# Patient Record
Sex: FEMALE | Race: OTHER
[De-identification: names, ages, dates, MRNs, and addresses within clinical notes are randomized per-mention and may not be internally consistent; named-entity substitution may affect disease eponyms.]

---

## 2017-06-11 ENCOUNTER — HOSPITAL ENCOUNTER (EMERGENCY)
Dept: HOSPITAL 80 - FED | Age: 37
Discharge: HOME | End: 2017-06-11
Payer: MEDICAID

## 2017-06-11 VITALS — OXYGEN SATURATION: 94 %

## 2017-06-11 VITALS — TEMPERATURE: 98.1 F

## 2017-06-11 VITALS — HEART RATE: 78 BPM | DIASTOLIC BLOOD PRESSURE: 69 MMHG | RESPIRATION RATE: 18 BRPM | SYSTOLIC BLOOD PRESSURE: 138 MMHG

## 2017-06-11 DIAGNOSIS — M25.512: Primary | ICD-10-CM

## 2017-06-11 DIAGNOSIS — J45.909: ICD-10-CM

## 2017-06-11 NOTE — EDPHY
H & P


Stated Complaint: lifting weights friday/developed r arm and shoulder pain


Time Seen by Provider: 06/11/17 12:23





- Personal History


LMP (Females 10-55): 1-7 Days Ago


Current Tetanus/Diphtheria Vaccine: Yes





- Medical/Surgical History


Hx Asthma: Yes


Hx Chronic Respiratory Disease: No


Hx Diabetes: No


Hx Cardiac Disease: No


Hx Renal Disease: No


Hx Cirrhosis: No


Hx Alcoholism: No


Hx HIV/AIDS: No


Hx Splenectomy or Spleen Trauma: No


Other PMH: PMHx: denies.  PSHx: denies





- Social History


Smoking Status: Never smoked


Constitutional: 


 Initial Vital Signs











Temperature (C)  36.7 C   06/11/17 11:21


 


Heart Rate  68   06/11/17 11:21


 


Respiratory Rate  17   06/11/17 11:21


 


Blood Pressure  136/86 H  06/11/17 11:21


 


O2 Sat (%)  98   06/11/17 11:21








 











O2 Delivery Mode               Room Air














Allergies/Adverse Reactions: 


 





albuterol Allergy (Verified 06/11/17 11:20)


 








Home Medications: 














 Medication  Instructions  Recorded


 


Hydrocodone/APAP 5/325 [Norco 1 - 2 each PO Q4-6PRN PRN #20 tab 06/11/17





5/325]  














Medical Decision Making





- Diagnostics


Imaging Results: 


 Imaging Impressions





Chest X-Ray  06/11/17 12:28


Impression: Chest negative for acute posttraumatic sequela.








Shoulder X-Ray  06/11/17 12:28


Impression: Negative for fracture. Query minimal grade 1 AC separation versus 

normal variant.








Chest/Thorax CTA  06/11/17 13:15


Impression:


1. No evidence of pulmonary embolic disease.


2. See above report for additional findings.


 


Results called to Zhang Whitehead MD on 6/11/2017 at 14:37


 


 


 











ED Course/Re-evaluation: 





CHIEF COMPLAINT:  Shoulder pain





HISTORY OF PRESENT ILLNESS:  





This patient is a 37 year old female arriving with her family complaining of 

right-sided shoulder pain onset []. She states the pain extends from her right 

collarbone around to her right scapula. She states the pain increases with 

movement. She denies pain with respiration. arm hurting from collarbone around 

to shoulder blade.  She has taken 600mg Ibuprofen about 10 minutes ago, around 

12:15. She denies recent trauma or illness. 





REVIEW OF SYSTEMS:  





A 10 point review of systems was performed and is negative with the exception 

of the elements mentioned in the history of present illness.





PHYSICAL EXAM:  





HR, BP, O2 Sat, RR.  Temp noted


General Appearance:  Alert, well hydrated, appropriate, and non-toxic appearing.


Head:  Atraumatic without scalp tenderness or obvious injury


Eyes:  Pupils equal, round, reactive to light and accommodation, EOMI, no trauma

, no injection.


Ears:  Clear bilaterally, no perforation, normal landmarks


Nose:  Atraumatic, no rhinorrhea, clear.


Throat:  There is no erythema or exudates, no lesions, normal tonsils, mucus 

membranes moist.


Neck:  Supple, 2+ carotid upstroke, nontender, no lymphadenopathy.


Respiratory:  No retractions, no distress, no wheezes, and no accessory muscle 

use.  Lungs are clear to auscultation bilaterally.


Cardiovascular:  Regular rate and rhythm, no murmurs, rubs, or gallops. 

Bilateral carotid, radial, dorsalis pedis, and posterior tibial pulses intact. 

Good capillary refill all extremities.


Gastrointestinal:  Abdomen is soft, nontender, non-distended, no masses, no 

rebound, no guarding, no peritoneal signs.


Musculoskeletal:  Normal active ROM of all extremities, atraumatic.


Neurological:  Alert, appropriate, and interactive.  The patient has normal 

DTRs and non-focal cranial nerves, motor, sensory, and cerebellar exam.


Skin:  No rashes, good turgor, no nodules on palpation.





Past medical history: Denies 


Past surgical history: Denies


Family history: Noncontributory 


Social history: Family at bedside 








DIFFERENTIAL DIAGNOSIS:  


 The differential diagnosis for the patient's shoulder pain included but was 

not limited to musculo-skeletal pain, pulmonary embolism, strain, and fracture.

  





MEDICAL DECISION MAKING: 


This patient is a 37 year old female presenting today with right-sided shoulder 

pain. Physical exam reveals tenderness to right shoulder area from collarbone 

to scapula. I offered further pain management, but the patient declined 

medications for pain at this time. Plan for chest x-ray and right shoulder x-

ray. 





X-rays unremarkable. Plan for CT to further evaluate for acute processes.





1:05 Dr. Doc Stewart, radiologist, agrees no acute processes revealed on x-ray. 





Plan for Istat, PE study.





13:36 Istat within normal limits. Will proceed with CT Angiogram.





CT Angio unremarkable. Labs unremarkable. No acute processes identified. 





15:00 Reassessed the patient. She is feeling much better. I discussed her 

negative workup, and she appears relieved. Plan to discharge home in good 

condition with referral to orthopedic specialist if needed for symptoms 

unresolved. She will be given a prescription for Norco for pain management. The 

patient is comfortable with this plan.    





- Data Points


Laboratory Results: 


 











  06/11/17





  13:27


 


POC Hgb  15.3 gm/dL gm/dL





   (12.6-16.3) 


 


POC Hct  45 % %





   (38-47) 


 


POC Sodium  144 mEq/L mEq/L





   (134-144) 


 


POC Potassium  4.3 mEq/L mEq/L





   (3.3-5.0) 


 


POC Chloride  104 mEq/L mEq/L





   () 


 


POC BUN  16 mg/dL mg/dL





   (7-23) 


 


POC Creatinine  1.0 mg/dL mg/dL





   (0.6-1.0) 


 


POC Glucose  95 mg/dL mg/dL





   () 











Point of Care Test Results: 


 











  06/11/17





  13:27


 


POC Sodium  144


 


POC Potassium  4.3


 


POC Chloride  104


 


POC BUN  16


 


POC Creatinine  1.0


 


POC Glucose  95














Departure





- Departure


Disposition: Home, Routine, Self-Care


Clinical Impression: 


Left shoulder pain


Qualifiers:


 Chronicity: acute Qualified Code(s): M25.512 - Pain in left shoulder





Condition: Good


Instructions:  Shoulder Pain (ED)


Additional Instructions: 


1. Take Norco as prescribed as needed for pain and inflammation. You may also 

continue to take Ibuprofen as directed on the packaging as needed.  


2. Follow up with an orthopedic specialist for symptoms unresolved this week. 


3. Return to the ER for numbness, weakness, worsening pain, or other worsening 

of condition.  


Referrals: 


Lela Ibanez [Primary Care Provider] - As per Instructions


Harish Navarrete MD [Medical Doctor] - As per Instructions


Prescriptions: 


Hydrocodone/APAP 5/325 [Norco 5/325] 1 - 2 each PO Q4-6PRN PRN #20 tab


 PRN Reason: Pain, Moderate


Report Scribed for: Zhang Whitehead


Report Scribed by: Ana Burnette


Date of Report: 06/11/17


Time of Report: 13:36

## 2018-09-18 ENCOUNTER — HOSPITAL ENCOUNTER (EMERGENCY)
Dept: HOSPITAL 80 - FED | Age: 38
Discharge: HOME | End: 2018-09-18
Payer: COMMERCIAL

## 2018-09-18 VITALS — DIASTOLIC BLOOD PRESSURE: 83 MMHG | SYSTOLIC BLOOD PRESSURE: 122 MMHG

## 2018-09-18 DIAGNOSIS — R42: ICD-10-CM

## 2018-09-18 DIAGNOSIS — M79.602: Primary | ICD-10-CM

## 2018-09-18 NOTE — CPEKG
Test Reason : OPEN

Blood Pressure : ***/*** mmHG

Vent. Rate : 064 BPM     Atrial Rate : 064 BPM

   P-R Int : 154 ms          QRS Dur : 115 ms

    QT Int : 424 ms       P-R-T Axes : 015 017 038 degrees

   QTc Int : 438 ms

 

Sinus rhythm

Nonspecific intraventricular conduction delay

 

Confirmed by McCollester, Laughlin ([id number]) on 9/18/2018 11:04:50 PM

 

Referred By:             Confirmed By:Laughlin McCollester

## 2023-08-23 NOTE — EDPHY
H & P


Time Seen by Provider: 18 17:28


HPI/ROS: 





HPI


Left arm pain.  Dizzy.





38-year-old female by private vehicle with her .  This patient reports 

that he she was doing pull-ups this last Friday.  She reports that since that 

time she has had left arm pain.  She describes this in both her left arm and 

her left shoulder.  She reports that today she had a pain, aching sensation 

just underneath her left eye.  No changes in vision.  She reports that she did 

have some transient vertigo while driving her car.  She describes this as 

feeling like the room is spinning.  She reports that this lasted a few minutes 

and then resolved.  She has not had vertigo since.  She has had no difficulty 

ambulating.  Denies headache.  Denies any loss of sensation or weakness in her 

extremities.  No chest pain.  No shortness of breath.  No fever.  No neck pain.





The patient has been seen multiple times in the past for similar complaints.  

She has had a negative shoulder x-ray, unremarkable chest x-ray an unremarkable 

CTA of her chest.





ROS:





Constitutional:  No fever, no chills.  As above.


Eyes:  No discharge.  No changes in vision.


ENT:  No sore throat.  No nasal congestion or rhinorrhea.


Respiratory:  No cough.  No shortness of breath.


Cardiac:  No chest pain, no palpitations.


Gastrointestinal:  No abdominal pain, no vomiting, no diarrhea.


Genitourinary:  No hematuria.  No dysuria or increased frequency with urination.


Musculoskeletal:  No back pain.  No neck pain.  As above.


Skin:  No rashes.


Neurological:  No headache.  No focal weakness or altered sensation.





Past medical history:  She denies any significant past medical history.





Social history:  Nonsmoker.  Here with her .  No alcohol.





Physical Exam:





General Appearance:  Alert, no distress.  This patient is responding to 

questions appropriately and in full sentences.  This patient appears well-

hydrated and well-nourished.


Eyes:  Pupils equal and round no pallor or injection.  No lid edema, erythema 

or injection.


ENT, Mouth:  Mucous membranes are moist.  The pharyngeal tissues are 

unremarkable.  No edema or swelling.  No asymmetry suggestive of abscess.  No 

erythema or exudates.  No voice changes.  No stridor.  No cervical, submental, 

submandibular lymphadenopathy.


Respiratory:  There are no retractions, lungs are clear to auscultation with 

good air movement bilaterally.


Cardiovascular:  Regular rate and rhythm.  No murmur.


Gastrointestinal:  Abdomen is soft and nontender, no masses, bowel sounds 

normal.  No focal tenderness at McBurney's point.  No Vera sign.


Neurological:  Motor sensory function is grossly intact.  Cranial nerves are 

normal.  Cranial nerve 5 distributions are all intact.  Specifically on 

examination of the left eye.  No infraorbital paresthesia.  No diplopia on 

upward gaze.  No soft tissue changes involving the face.  Gait is normal.  

Cerebellar function normal.


Skin:  Warm and dry, no rashes.


Musculoskeletal:  Neck is supple and nontender.  No midline cervical, thoracic, 

lumbar tenderness on palpation.  No pain elicited by flexion of her neck.


Extremities are symmetrical.  All joints range without pain or impingement.  

Left shoulder ranges without any significant pain or impingement in all planes 

of motion.  There is no asymmetric swelling.


Psychiatric:  No agitation.  No depression.





Database:





EKG:





EKG time is 5:47 p.m.; EKG shows a narrow complex normal sinus rhythm with a 

ventricular rate of 64. Nonspecific intraventricular conduction delay noted.  

The NH, QRS, QT intervals are within normal limits.  There are no ST-T wave 

changes indicative of ischemic or injury pattern.  No evidence of right heart 

strain.  No evidence of Brugada syndrome, WPW, hypertrophic cardiomyopathy.  

Interpreted by me.





Imaging:





Procedures:





Emergency department course:





Triage vital signs reviewed and are normal.  EKG to be obtained.  Left shoulder 

and left upper extremity pain consistent with musculoskeletal etiology.  Likely 

from her work out and doing pull-ups on Friday.





6:00 p.m., patient re-evaluated.  Results of EKG discussed with her and her 

.  Repeat neurologic Assessment is nonfocal.  Her gait is normal.  She 

currently does not have any put complaints.  I feel she is safe for discharge.  

Follow-up and return to emergency department precautions reviewed with her.  

All of her questions were answered.  She was discharged with her  in 

good condition.





Differential Diagnosis:





The differential diagnosis on this patient includes but is not limited to 

musculoskeletal left upper extremity pain transient peripheral vertigo.  Acute 

coronary syndrome, arrhythmia, CVA, vertebral artery dissection, carotid artery 

dissection, significant traumatic injury unlikely.  This represents a partial 

list of diagnoses considered.  These considerations are based on history, 

physical exam, past history, reassessment and diagnostic testing.


Smoking Status: Never smoked


Constitutional: 


 Initial Vital Signs











Temperature (C)  37.2 C   18 17:12


 


Heart Rate  62   18 17:12


 


Respiratory Rate  16   18 17:12


 


Blood Pressure  122/83 H  18 17:12


 


O2 Sat (%)  96   18 17:12








 











O2 Delivery Mode               Room Air














Allergies/Adverse Reactions: 


 





albuterol Allergy (Verified 18 17:12)


 











Departure





- Departure


Disposition: Home, Routine, Self-Care


Clinical Impression: 


 Left arm pain





Condition: Good


Instructions:  Vertigo (ED), Arm Pain (ED)


Additional Instructions: 


Read and follow provided instructions.





Follow-up with your primary care physician in 1-2 days for re-evaluation.





Keep well hydrated.





Ibuprofen dosin mg every 6 hours with meals for the next 3 days only.  

Take only as needed for pain.





Return to the emergency department for worsening symptoms or other serious 

concerns.


Referrals: 


Nevin Waters PA [Primary Care Provider] - As per Instructions yes